# Patient Record
(demographics unavailable — no encounter records)

---

## 2017-01-01 NOTE — HP
- Maternal History


HBSAG: Negative


Date: 16


RPR: Negative


Date: 16


Group B Strep: Negative


HIV: Negative





- Maternal Risks


OB Risks: Hx HPV.  HX HSV1.  RH Negative 16. Positive Anti D, titer 1:1.  

 2015.  SPAB X1.





[place] Data





- Admission


Date of Admission: 17


Admission Time: 20:34


Date of Delivery: 17


Time of Delivery: 20:20


Wks Gestation by Dates: 38.2


Wks Gestation by Sono: 38.2


Infant Gender: Female


Type of Delivery: 


Apgar Score @1 Minute: 9


Apgar score @ 5 Minutes: 10


Birth Weight: 3.3 kg


Birth Length: 19 in


Head Circumference, Admission: 32.0


Chest Circumference: 33.0


Abdominal Girth: 33.0





- Vital Signs


  ** Left Calf


Blood Pressure: 68/42


Blood Pressure Mean: 50





  ** Right Calf


Blood Pressure: 71/46


Blood Pressure Mean: 54





  ** Left Lower Arm


Blood Pressure: 63/46


Blood Pressure Mean: 51





  ** Right Lower Arm


Blood Pressure: 72/48


Blood Pressure Mean: 56





- Hearing Screen


Left Ear: Passed


Right Ear: Passed


Hearing Screen Complete: 17





- [facility] Screening


[place] Screening Card Number: [id number]





[place] Infant, Physical Exam





- [place] Infant, Admission Exam


Birth Weight: 3.3 kg


Birth Length: 19 in


Chest Circumference: 33.0


Initial Vital Signs: 


 Initial Vital Signs











Temp Pulse Resp


 


 97.8 F   150   42 


 


 17 21:00  17 21:00  17 21:00











General Appearance: Yes: Well flexed


Skin: No: Rashes


Head: Yes: Fontanel flat


Eyes: Yes: Clear, Pupils equal


Ears: Yes: Symmetrical.  No: Periauricular sinus, Periauricular skin tag


Nose: Yes: Nares patent


Mouth: No: Cleft lip, Cleft palate


Chest: Yes: Symmetrical, Clavicles intact.  No: Crepitus


Lungs/Respiratory: Yes: Clear, Bilateral good air entry


Cardiac: Yes: S1, S2, Peripheral pulses strong, Capillary refill immediat.  No: 

Murmur


Abdomen: Yes: No Abnormalities


Gastrointestinal: Yes: Active bowel sounds


Genitalia: No Abnormalities


Genitalia, Female: Yes: Labia Normal


Anus: Yes: Patent


Extremities: Yes: 10 Fingers, 10 Toes


Clavicles: No abnormalities


Femoral Pulse: Strong


Ortolani Test: Negative


Aguilar Test: Negative


Spine: Yes: Sacral dimple (small sacral dimple with visualization of end 

terminal).  No: Sacral tracts


Reflexes: [place]: Present, Rooting: Present, Sucking: Present


Neuro: Yes: Alert, Active


Cry: Yes: Strong





Problem List





- Problems


(1) Single liveborn infant delivered vaginally


Assessment/Plan: 


1 day old baby girl, born FTAGA, , Apgar 9/10, Bt WT 7.4 lbs. No 

complications during pregnancy or delivery. All maternal labs negative with 

positive anti D titer. Baby doing well.





Plan


Routine  care


Encouraged breastfeeding


Code(s): Z38.00 - SINGLE LIVEBORN INFANT, DELIVERED VAGINALLY

## 2017-01-01 NOTE — DS
- Maternal History


HBSAG: Negative


Date: 16


RPR: Negative


Date: 16


Group B Strep: Negative


HIV: Negative





- Maternal Risks


OB Risks: Hx HPV.  HX HSV1.  RH Negative 16. Positive Anti D, titer 1:1.  

 2015.  SPAB X1.





[place] Data





- Admission


Date of Admission: 17


Admission Time: 20:34


Date of Delivery: 17


Time of Delivery: 20:20


Wks Gestation by Dates: 38.2


Wks Gestation by Sono: 38.2


Infant Gender: Female


Type of Delivery: 


Apgar Score @1 Minute: 9


Apgar score @ 5 Minutes: 10


Birth Weight: 3.3 kg


Birth Length: 19 in


Head Circumference, Admission: 32.0


Chest Circumference: 33.0


Abdominal Girth: 33.0





- Vital Signs


  ** Left Calf


Blood Pressure: 68/42


Blood Pressure Mean: 50





  ** Right Calf


Blood Pressure: 71/46


Blood Pressure Mean: 54





  ** Left Lower Arm


Blood Pressure: 63/46


Blood Pressure Mean: 51





  ** Right Lower Arm


Blood Pressure: 72/48


Blood Pressure Mean: 56





- Hearing Screen


Left Ear: Passed


Right Ear: Passed


Hearing Screen Complete: 17





- Labs


Labs: 


 Transcutaneous Bilirubin











Transcutaneous Bilirubin       17





performed                      


 


Transcutaneous Bilirubin       0.3





result                         











 Baby's Blood Type, Natalie











Cord Blood Type  A POSITIVE   17  20:20    


 


YEMI, Poly Interpret  Negative  (NEGATIVE)   17  20:20    














- [facility] Screening


[place] Screening Card Number: [id number]





 PE, Discharge





- Physical Exam


Last Weight Documented: 3.203 kg


Vital Signs: 


 Vital Signs











Temperature  98.1 F   17 08:00


 


Pulse Rate  150   17 21:00


 


Respiratory Rate  42   17 21:00


 


Blood Pressure  68/42   17 09:07


 


O2 Sat by Pulse Oximetry (%)  100   17 20:08








 SpO2





Preductal SpO2, Right Arm        100


Postductal SpO2 [Left Leg]       100








General Appearance: Yes: Well flexed


Skin: No: Rashes


Head: Yes: Fontanel flat


Eyes: Yes: Clear, Pupils equal


Ears: Yes: Symmetrical.  No: Periauricular sinus, Periauricular skin tag


Nose: Yes: Nares patent


Mouth: No: Cleft lip, Cleft palate


Chest: Yes: Symmetrical, Clavicles intact.  No: Crepitus


Lungs/Respiratory: Yes: Clear, Bilateral good air entry


Cardiac: Yes: S1, S2, Peripheral pulses strong, Capillary refill immediat.  No: 

Murmur


Abdomen: Yes: No Abnormalities


Gastrointestinal: Yes: Active bowel sounds


Genitalia: No Abnormalities


Genitalia, Female: Yes: Labia Normal


Anus: Yes: Patent


Extremities: Yes: 10 Fingers, 10 Toes


Spine: Yes: Sacral dimple (small sacral dimple with visualization of end 

terminal).  No: Sacral tracts


Reflexes: [place]: Present, Rooting: Present, Sucking: Present


Neuro: Yes: Alert, Active


Cry: Yes: Strong


Preductal SpO2, Right Arm: 100


  ** Left Leg


Postductal SpO2: 100





Problem List





- Problems


(1) Single liveborn infant delivered vaginally


Assessment/Plan: 


2 day old baby girl, born FTAGA, , Apgar 9/10, Bt WT 7.4 lbs. No 

complications during pregnancy or delivery. All maternal labs negative with 

positive anti D titer. Baby doing well.Tc bili 0.3 (low risk zone), no other 

risk factors for hyperbilirrubinemia





Plan


Discharge home


Routine  care


Encouraged breastfeeding


Back to sleep


Do not shake


Fu @ 2 Park in 2-3 days


Code(s): Z38.00 - SINGLE LIVEBORN INFANT, DELIVERED VAGINALLY








Discharge Summary


Reason For Visit:  ADMIT


Current Active Problems





Single liveborn infant delivered vaginally (Acute) 








Condition: Good





- Instructions


Diet, Activity, Other Instructions: 





Plan


Discharge home


Routine  care


Encouraged breastfeeding


Back to sleep


Do not shake


Fu @ 2 Park in 2-3 days


Disposition: HOME